# Patient Record
Sex: MALE | Race: ASIAN | NOT HISPANIC OR LATINO | URBAN - METROPOLITAN AREA
[De-identification: names, ages, dates, MRNs, and addresses within clinical notes are randomized per-mention and may not be internally consistent; named-entity substitution may affect disease eponyms.]

---

## 2019-12-10 ENCOUNTER — OUTPATIENT (OUTPATIENT)
Dept: INPATIENT UNIT | Facility: HOSPITAL | Age: 44
LOS: 1 days | Discharge: HOME | End: 2019-12-10
Payer: COMMERCIAL

## 2019-12-10 VITALS
SYSTOLIC BLOOD PRESSURE: 120 MMHG | WEIGHT: 188.94 LBS | RESPIRATION RATE: 16 BRPM | HEIGHT: 69.29 IN | HEART RATE: 67 BPM | OXYGEN SATURATION: 98 % | DIASTOLIC BLOOD PRESSURE: 80 MMHG | TEMPERATURE: 97 F

## 2019-12-10 VITALS
SYSTOLIC BLOOD PRESSURE: 101 MMHG | DIASTOLIC BLOOD PRESSURE: 61 MMHG | TEMPERATURE: 97 F | OXYGEN SATURATION: 98 % | RESPIRATION RATE: 13 BRPM | HEART RATE: 60 BPM

## 2019-12-10 DIAGNOSIS — Z98.890 OTHER SPECIFIED POSTPROCEDURAL STATES: Chronic | ICD-10-CM

## 2019-12-10 PROCEDURE — C1889: CPT

## 2019-12-10 PROCEDURE — C1769: CPT

## 2019-12-10 PROCEDURE — 87086 URINE CULTURE/COLONY COUNT: CPT

## 2019-12-10 PROCEDURE — 82365 CALCULUS SPECTROSCOPY: CPT

## 2019-12-10 PROCEDURE — C1758: CPT

## 2019-12-10 PROCEDURE — 76000 FLUOROSCOPY <1 HR PHYS/QHP: CPT

## 2019-12-10 PROCEDURE — 52356 CYSTO/URETERO W/LITHOTRIPSY: CPT | Mod: RT

## 2019-12-10 PROCEDURE — C2617: CPT

## 2019-12-10 RX ORDER — OXYCODONE AND ACETAMINOPHEN 5; 325 MG/1; MG/1
1 TABLET ORAL EVERY 4 HOURS
Refills: 0 | Status: DISCONTINUED | OUTPATIENT
Start: 2019-12-10 | End: 2019-12-10

## 2019-12-10 RX ORDER — HYDROMORPHONE HYDROCHLORIDE 2 MG/ML
1 INJECTION INTRAMUSCULAR; INTRAVENOUS; SUBCUTANEOUS EVERY 4 HOURS
Refills: 0 | Status: DISCONTINUED | OUTPATIENT
Start: 2019-12-10 | End: 2019-12-10

## 2019-12-10 RX ORDER — METOCLOPRAMIDE HCL 10 MG
10 TABLET ORAL ONCE
Refills: 0 | Status: DISCONTINUED | OUTPATIENT
Start: 2019-12-10 | End: 2019-12-10

## 2019-12-10 RX ORDER — ONDANSETRON 8 MG/1
4 TABLET, FILM COATED ORAL EVERY 6 HOURS
Refills: 0 | Status: DISCONTINUED | OUTPATIENT
Start: 2019-12-10 | End: 2019-12-10

## 2019-12-10 RX ORDER — SODIUM CHLORIDE 9 MG/ML
1000 INJECTION, SOLUTION INTRAVENOUS
Refills: 0 | Status: DISCONTINUED | OUTPATIENT
Start: 2019-12-10 | End: 2019-12-10

## 2019-12-10 RX ORDER — OXYCODONE AND ACETAMINOPHEN 5; 325 MG/1; MG/1
2 TABLET ORAL EVERY 6 HOURS
Refills: 0 | Status: DISCONTINUED | OUTPATIENT
Start: 2019-12-10 | End: 2019-12-10

## 2019-12-10 NOTE — PACU DISCHARGE NOTE - COMMENTS
Pt discharged to home.  [id number] used to give discharge paperwork and instructions to pt and wife. Iv heplock removed. Safety protocol maintained.

## 2019-12-10 NOTE — ASU PREOP CHECKLIST - WEIGHT IN KG
Pt. present to ED c/o persistent headache x 1 week. Pt. is on Pradaxa. Pt. also is hypertensive today. Pt. had some relief with Ibuprofen, but headache is not going away. Pt. was concerned due to the fact that he is on a blood thinner and thus came to the ED. Pt. will be sent to the main room for further evaluation.
85.7

## 2019-12-10 NOTE — BRIEF OPERATIVE NOTE - NSICDXBRIEFPROCEDURE_GEN_ALL_CORE_FT
PROCEDURES:  Cystoscopy with ureteroscopic laser lithotripsy and insertion of stent 10-Dec-2019 09:15:18 right Oren Martinez

## 2019-12-12 LAB — NIDUS STONE QN: SIGNIFICANT CHANGE UP

## 2019-12-13 LAB
CULTURE RESULTS: NO GROWTH — SIGNIFICANT CHANGE UP
SPECIMEN SOURCE: SIGNIFICANT CHANGE UP